# Patient Record
Sex: MALE | Race: WHITE | Employment: FULL TIME | URBAN - METROPOLITAN AREA
[De-identification: names, ages, dates, MRNs, and addresses within clinical notes are randomized per-mention and may not be internally consistent; named-entity substitution may affect disease eponyms.]

---

## 2023-01-18 ENCOUNTER — HOSPITAL ENCOUNTER (EMERGENCY)
Age: 58
Discharge: HOME | End: 2023-01-18
Attending: EMERGENCY MEDICINE
Payer: COMMERCIAL

## 2023-01-18 VITALS
HEART RATE: 63 BPM | DIASTOLIC BLOOD PRESSURE: 83 MMHG | RESPIRATION RATE: 20 BRPM | SYSTOLIC BLOOD PRESSURE: 180 MMHG | TEMPERATURE: 97.1 F | OXYGEN SATURATION: 99 %

## 2023-01-18 DIAGNOSIS — M54.32 SCIATICA OF LEFT SIDE: Primary | ICD-10-CM

## 2023-01-18 PROCEDURE — 74011636637 HC RX REV CODE- 636/637: Performed by: EMERGENCY MEDICINE

## 2023-01-18 PROCEDURE — 96372 THER/PROPH/DIAG INJ SC/IM: CPT

## 2023-01-18 PROCEDURE — 99284 EMERGENCY DEPT VISIT MOD MDM: CPT

## 2023-01-18 PROCEDURE — 74011250636 HC RX REV CODE- 250/636: Performed by: EMERGENCY MEDICINE

## 2023-01-18 PROCEDURE — 74011250637 HC RX REV CODE- 250/637: Performed by: EMERGENCY MEDICINE

## 2023-01-18 RX ORDER — PREDNISONE 20 MG/1
60 TABLET ORAL
Status: COMPLETED | OUTPATIENT
Start: 2023-01-18 | End: 2023-01-18

## 2023-01-18 RX ORDER — PREDNISONE 5 MG/1
TABLET ORAL
Qty: 21 TABLET | Refills: 0 | Status: SHIPPED | OUTPATIENT
Start: 2023-01-18

## 2023-01-18 RX ORDER — KETOROLAC TROMETHAMINE 30 MG/ML
30 INJECTION, SOLUTION INTRAMUSCULAR; INTRAVENOUS ONCE
Status: COMPLETED | OUTPATIENT
Start: 2023-01-18 | End: 2023-01-18

## 2023-01-18 RX ORDER — KETOROLAC TROMETHAMINE 10 MG/1
10 TABLET, FILM COATED ORAL
Qty: 10 TABLET | Refills: 0 | Status: SHIPPED | OUTPATIENT
Start: 2023-01-18

## 2023-01-18 RX ORDER — DIAZEPAM 5 MG/1
2.5 TABLET ORAL ONCE
Status: COMPLETED | OUTPATIENT
Start: 2023-01-18 | End: 2023-01-18

## 2023-01-18 RX ADMIN — KETOROLAC TROMETHAMINE 30 MG: 30 INJECTION, SOLUTION INTRAMUSCULAR; INTRAVENOUS at 05:45

## 2023-01-18 RX ADMIN — DIAZEPAM 2.5 MG: 5 TABLET ORAL at 05:45

## 2023-01-18 RX ADMIN — PREDNISONE 60 MG: 20 TABLET ORAL at 05:45

## 2023-01-18 NOTE — ED TRIAGE NOTES
Pt arrives to triage w/ complaint of lower back pain. Pt sees dr. Wing Kinsey w/ [facility] and has an appointment scheduled today but the pain as too bad to wait.

## 2023-01-18 NOTE — ED PROVIDER NOTES
55-year-old male with past medical history significant for lumbar spine degenerative disc disease presents with complaints of low back pain exacerbated over the past 2 days and radiating into left lower extremity causing numbness and tingling. Denies lower extremity. Denies saddle paresthesia. Denies changes in bowel or bladder habits. Patient reports pain was severe when weightbearing. He could not tolerate. Patient has an appointment scheduled for this afternoon with his orthopedist, Dr. Cristian Barillas, but pain was too severe. Denies any recent trauma. Denies fever, chills, nausea, vomiting, diarrhea, constipation, chest pain, shortness of breath. Similar pain in past relieved with corticosteroid injections by orthopedist.    Former smoker, occasional alcohol use, denies drug use  Ortho-gotke         Past Medical History:   Diagnosis Date    Bilateral carotid artery disease ([facility] [address]     [facility] panelApril 2014    LBBB (left bundle branch block)        Past Surgical History:   Procedure Laterality Date    HX ORTHOPAEDIC      right knee surgery         No family history on file.     Social History     Socioeconomic History    Marital status:      Spouse name: Not on file    Number of children: Not on file    Years of education: Not on file    Highest education level: Not on file   Occupational History    Not on file   Tobacco Use    Smoking status: Former    Smokeless tobacco: Not on file   Substance and Sexual Activity    Alcohol use: Yes     Comment: glass of wine    Drug use: No    Sexual activity: Not on file   Other Topics Concern    Not on file   Social History Narrative    Not on file     Social Determinants of Health     Financial Resource Strain: Not on file   Food Insecurity: Not on file   Transportation Needs: Not on file   Physical Activity: Not on file   Stress: Not on file   Social Connections: Not on file   Intimate Partner Violence: Not on file   Housing Stability: Not on file ALLERGIES: Patient has no known allergies. Review of Systems   Constitutional:  Negative for chills and fever. Respiratory:  Negative for cough and shortness of breath. Cardiovascular:  Negative for chest pain. Gastrointestinal:  Negative for abdominal pain, nausea and vomiting. Genitourinary:  Negative for dysuria and urgency. Musculoskeletal:  Positive for back pain. Skin:  Negative for wound. Neurological:  Negative for seizures and headaches. Vitals:    01/18/23 0448   BP: (!) 180/83   Pulse: 63   Resp: 20   Temp: 97.1 °F (36.2 °C)   SpO2: 99%            Physical Exam  Vitals and nursing note reviewed. Constitutional:       Appearance: He is well-developed. HENT:      Head: Normocephalic and atraumatic. Eyes:      Pupils: Pupils are equal, round, and reactive to light. Cardiovascular:      Rate and Rhythm: Normal rate and regular rhythm. Heart sounds: Normal heart sounds. Pulmonary:      Effort: Pulmonary effort is normal. No respiratory distress. Breath sounds: Normal breath sounds. No wheezing. Musculoskeletal:         General: Normal range of motion. Cervical back: Normal range of motion and neck supple. Lumbar back: No swelling, edema, deformity, signs of trauma or bony tenderness. Skin:     General: Skin is warm and dry. Neurological:      Mental Status: He is alert and oriented to person, place, and time. Psychiatric:         Behavior: Behavior normal.        Medical Decision Making  59-year-old male with degenerative disc disease presents with sciatica pain. Patient is well-appearing, no acute distress, hemodynamically stable, afebrile, nontoxic, no evidence of cauda equina. Plan-pain control. Amount and/or Complexity of Data Reviewed  Independent Historian: spouse    Risk  Prescription drug management. Procedures      5:50 AM  Patient's results have been reviewed with them.   Patient and/or family have verbally conveyed their understanding and agreement of the patient's signs, symptoms, diagnosis, treatment and prognosis and additionally agree to follow up as recommended or return to the Emergency Room should their condition change prior to follow-up. Discharge instructions have also been provided to the patient with some educational information regarding their diagnosis as well a list of reasons why they would want to return to the ER prior to their follow-up appointment should their condition change.

## 2023-03-10 ENCOUNTER — TELEPHONE (OUTPATIENT)
Dept: CARDIOLOGY CLINIC | Age: 58
End: 2023-03-10

## 2023-03-10 NOTE — TELEPHONE ENCOUNTER
Pt is a New Patient and trying to get cardiac clearance for Back surgery on 3/21/23  [address] [phone]. Patient has seen Tanner Donaldson in the past and is trying to established with him.        [phone]

## 2023-03-14 NOTE — TELEPHONE ENCOUNTER
Attempted to reach patient by telephone. A message was left for return call.      Future Appointments   Date Time Provider Annabella Haines   3/16/2023  4:00 PM MD ANDREAS Sousa AMB

## 2023-03-16 ENCOUNTER — OFFICE VISIT (OUTPATIENT)
Dept: CARDIOLOGY CLINIC | Age: 58
End: 2023-03-16
Payer: COMMERCIAL

## 2023-03-16 VITALS
RESPIRATION RATE: 18 BRPM | HEIGHT: 75 IN | DIASTOLIC BLOOD PRESSURE: 88 MMHG | HEART RATE: 70 BPM | WEIGHT: 195 LBS | SYSTOLIC BLOOD PRESSURE: 136 MMHG | BODY MASS INDEX: 24.25 KG/M2 | OXYGEN SATURATION: 100 %

## 2023-03-16 DIAGNOSIS — R07.9 CHEST PAIN, UNSPECIFIED TYPE: ICD-10-CM

## 2023-03-16 DIAGNOSIS — Z71.89 CARDIAC RISK COUNSELING: Primary | ICD-10-CM

## 2023-03-16 DIAGNOSIS — Z01.810 PRE-OPERATIVE CARDIOVASCULAR EXAMINATION: ICD-10-CM

## 2023-03-16 DIAGNOSIS — I65.23 BILATERAL CAROTID ARTERY OCCLUSION: ICD-10-CM

## 2023-03-16 DIAGNOSIS — I44.7 LBBB (LEFT BUNDLE BRANCH BLOCK): ICD-10-CM

## 2023-03-16 PROCEDURE — 99204 OFFICE O/P NEW MOD 45 MIN: CPT | Performed by: SPECIALIST

## 2023-03-16 PROCEDURE — 93000 ELECTROCARDIOGRAM COMPLETE: CPT | Performed by: SPECIALIST

## 2023-03-16 RX ORDER — LOSARTAN POTASSIUM 100 MG/1
100 TABLET ORAL DAILY
COMMUNITY
Start: 2023-03-07

## 2023-03-16 NOTE — PROGRESS NOTES
Danielle Bobo     1965       Kiana Mejia MD, [facility] [place]  Date of Visit-2023   PCP is Motley, Anell Najjar, MD   [facility] [place]  [facility]  HPI:  Danielle Bobo is a 62 y.o. male   Patient being seen for cardiac evaluation prior to a back surgery on 3/21/2023 saw Dr. Azar Bolton in [facility]. Patient was previously seen in 2016 records show office visit for chest pain and left bundle, Dr. Madison Zavala in 2016 referred for stress nuclear which showed inferior wall ischemia infarction is a possibility. We saw the patient to this. Recommended him to proceed to heart catheterization. Patient had heart cath on 2016 that showed normal coronaries normal ejection fraction. Patient had an echocardiogram 2016 that showed normal EF mild TR. Patient went to the emergency room on 2023 for sciatic on the left side. EKG today shows actually an EKG done at preadmission testing and shows sinus rhythm with a left bundle branch block. He is doing well and his back does not hurt he plays tennis rides bike physically active doing everything he would like to do he is an . He has no chest pain shortness of breath PND orthopnea. Left bundle branch is unchanged block is unchanged from the finding before. He had a cath that was normal he had a normal echo there is any suggest by physical exam or symptoms of things change. He is low axilla risk to proceed with back surgery. Please forward to preadmission testing at [facility] and Dr. Madison Zavala his primary care physician who he follows regular with he has hypertension he is on some losartan.     No quit smoking  smoked for 25 years drink 2 drinks of alcohol a week 3 cups of coffee   lives with son has 2 children likes tennis and biking family history mother passed of alcoholism at 67 father  unknown cause 80 review of systems positive for very rare palpitations that have not occurred recently and of the back pain which is main reason he is going the rest of 12 system review is negative. Today. .    EKG:   Key CAD CHF Meds               losartan (COZAAR) 100 mg tablet (Taking) Take 100 mg by mouth daily. Assessment/Plan:     There are no Patient Instructions on file for this visit. 1. Bilateral carotid artery occlusion  ***    2. LBBB (left bundle branch block)  ***  - AMB POC EKG ROUTINE W/ 12 LEADS, INTER & REP          Impression:   1. Bilateral carotid artery occlusion    2. LBBB (left bundle branch block)       Cardiac History:   No specialty comments available. No future appointments. Patient Care Team:  Lori Aguilar MD as PCP - General (Internal Medicine Physician)  Walter Conroy MD (Cardiovascular Disease Physician)   ROS-except as noted above. . A complete cardiac and respiratory are reviewed and negative except as above ; Resp-denies wheezing  or productive cough,. Const- No unusual weight loss or fever; Neuro-no recent seizure or CVA ; GI- No BRBPR, abdom pain, bloating ; - no  hematuria   see supplement sheet, initialed and to be scanned by staff    Past Medical History:   Diagnosis Date    Bilateral carotid artery disease ([facility] [address]     on [facility] panelApril 2014    LBBB (left bundle branch block)       Social Hx= reports that he has quit smoking. He has never been exposed to tobacco smoke. He does not have any smokeless tobacco history on file. He reports that he does not currently use alcohol after a past usage of about 2.0 standard drinks per week. He reports that he does not use drugs. Family Hx- family history is not on file. No Known Allergies     Exam and Labs:  Visit Vitals  /88 (BP 1 Location: Left upper arm, BP Patient Position: Sitting, BP Cuff Size: Adult)   Pulse 70   Resp 18   Ht 6' 3\" (1.905 m)   Wt 195 lb (88.5 kg)   SpO2 100%   BMI 24.37 kg/m²    @Constitutional:  NAD, comfortable  Head: NC,AT. Eyes: No scleral icterus.  Neck: Neck supple. No JVD present. Throat: moist mucous membranes. Chest: Effort normal & normal respiratory excursion . Neurological: alert, conversant and oriented . Skin: Skin is not cold. No obvious systemic rash noted. Not diaphoretic. No erythema. Psychiatric:  Grossly normal mood and affect. Behavior appears normal. Extremities:  no clubbing or cyanosis. Abdomen: non distended    Lungs:breath sounds normal. No stridor. distress, wheezes or  Rales. Heart:    {findings; exam heart:[id number]::\"normal rate, regular rhythm, normal S1, S2, no murmurs,   rubs, clicks or gallops\"} , PMI non displaced. Edema: Edema is none. No results found for: CHOL, CHOLX, CHLST, CHOLV, HDL, HDLP, LDL, LDLC, DLDLP, TGLX, TRIGL, TRIGP, CHHD, CHHDX  Lab Results   Component Value Date/Time    Sodium 141 08/08/2016 01:52 AM    Potassium 3.5 08/08/2016 01:52 AM    Chloride 103 08/08/2016 01:52 AM    CO2 28 08/08/2016 01:52 AM    Anion gap 10 08/08/2016 01:52 AM    Glucose 96 08/08/2016 01:52 AM    BUN 12 08/08/2016 01:52 AM    Creatinine 0.95 08/08/2016 01:52 AM    BUN/Creatinine ratio 13 08/08/2016 01:52 AM    GFR est AA >60 08/08/2016 01:52 AM    GFR est non-AA >60 08/08/2016 01:52 AM    Calcium 9.4 08/08/2016 01:52 AM      Wt Readings from Last 3 Encounters:   03/16/23 195 lb (88.5 kg)   08/26/16 195 lb (88.5 kg)   08/18/16 197 lb 9.6 oz (89.6 kg)      BP Readings from Last 3 Encounters:   03/16/23 136/88   01/18/23 (!) 180/83   08/26/16 134/84      Current Outpatient Medications   Medication Sig    losartan (COZAAR) 100 mg tablet Take 100 mg by mouth daily. predniSONE (STERAPRED) 5 mg dose pack See administration instruction per 5mg dose pack (Patient not taking: Reported on 3/16/2023)    ketorolac (TORADOL) 10 mg tablet Take 1 Tablet by mouth every six (6) hours as needed for Pain. (Patient not taking: Reported on 3/16/2023)    meclizine (ANTIVERT) 25 mg tablet Take 1 Tab by mouth three (3) times daily as needed for Dizziness. (Patient not taking: Reported on 3/16/2023)     No current facility-administered medications for this visit. Impression see above.

## 2023-04-18 ENCOUNTER — TELEPHONE (OUTPATIENT)
Dept: CARDIOLOGY CLINIC | Age: 58
End: 2023-04-18

## 2023-04-18 NOTE — TELEPHONE ENCOUNTER
MD Elizabeth Locke, RN; Jake Rainey, RN  See if [facility] PAT has him on for any surgery and if so send this note    Called and spoke with Maurice Corral from [facility] at [address] [place] [address]. States patient surgery was cancelled at this time. Advised that she had received note from 3/16/2023 and saved it in the event patient reschedules he surgery for a later date.

## 2024-06-29 ENCOUNTER — HOSPITAL ENCOUNTER (EMERGENCY)
Facility: HOSPITAL | Age: 59
Discharge: HOME | End: 2024-06-29
Attending: STUDENT IN AN ORGANIZED HEALTH CARE EDUCATION/TRAINING PROGRAM
Payer: COMMERCIAL

## 2024-06-29 VITALS
DIASTOLIC BLOOD PRESSURE: 92 MMHG | OXYGEN SATURATION: 98 % | HEART RATE: 56 BPM | RESPIRATION RATE: 16 BRPM | TEMPERATURE: 97.6 F | HEIGHT: 74 IN | BODY MASS INDEX: 25.66 KG/M2 | WEIGHT: 199.96 LBS | SYSTOLIC BLOOD PRESSURE: 147 MMHG

## 2024-06-29 DIAGNOSIS — S05.01XA ABRASION OF RIGHT CORNEA, INITIAL ENCOUNTER: ICD-10-CM

## 2024-06-29 DIAGNOSIS — Z77.098 CHEMICAL EXPOSURE OF EYE: Primary | ICD-10-CM

## 2024-06-29 PROCEDURE — 6370000000 HC RX 637 (ALT 250 FOR IP): Performed by: PHYSICIAN ASSISTANT

## 2024-06-29 PROCEDURE — 90714 TD VACC NO PRESV 7 YRS+ IM: CPT | Performed by: PHYSICIAN ASSISTANT

## 2024-06-29 PROCEDURE — 90471 IMMUNIZATION ADMIN: CPT | Performed by: PHYSICIAN ASSISTANT

## 2024-06-29 PROCEDURE — 99284 EMERGENCY DEPT VISIT MOD MDM: CPT

## 2024-06-29 PROCEDURE — 6360000002 HC RX W HCPCS: Performed by: PHYSICIAN ASSISTANT

## 2024-06-29 RX ORDER — ERYTHROMYCIN 5 MG/G
OINTMENT OPHTHALMIC ONCE
Status: COMPLETED | OUTPATIENT
Start: 2024-06-29 | End: 2024-06-29

## 2024-06-29 RX ORDER — ERYTHROMYCIN 5 MG/G
OINTMENT OPHTHALMIC
Qty: 3.5 G | Refills: 0 | Status: SHIPPED | OUTPATIENT
Start: 2024-06-29 | End: 2024-07-09

## 2024-06-29 RX ORDER — IBUPROFEN 400 MG/1
800 TABLET ORAL
Status: COMPLETED | OUTPATIENT
Start: 2024-06-29 | End: 2024-06-29

## 2024-06-29 RX ORDER — TETRACAINE HYDROCHLORIDE 5 MG/ML
2 SOLUTION OPHTHALMIC
Status: COMPLETED | OUTPATIENT
Start: 2024-06-29 | End: 2024-06-29

## 2024-06-29 RX ADMIN — TETRACAINE HYDROCHLORIDE 2 DROP: 5 SOLUTION OPHTHALMIC at 20:45

## 2024-06-29 RX ADMIN — FLUORESCEIN SODIUM 1 MG: 1 STRIP OPHTHALMIC at 20:45

## 2024-06-29 RX ADMIN — ERYTHROMYCIN 1 CM: 5 OINTMENT OPHTHALMIC at 21:44

## 2024-06-29 RX ADMIN — IBUPROFEN 800 MG: 400 TABLET, FILM COATED ORAL at 20:45

## 2024-06-29 RX ADMIN — CLOSTRIDIUM TETANI TOXOID ANTIGEN (FORMALDEHYDE INACTIVATED) AND CORYNEBACTERIUM DIPHTHERIAE TOXOID ANTIGEN (FORMALDEHYDE INACTIVATED) 0.5 ML: 5; 2 INJECTION, SUSPENSION INTRAMUSCULAR at 20:44

## 2024-06-29 ASSESSMENT — PAIN DESCRIPTION - DESCRIPTORS
DESCRIPTORS: BURNING
DESCRIPTORS: BURNING

## 2024-06-29 ASSESSMENT — PAIN - FUNCTIONAL ASSESSMENT
PAIN_FUNCTIONAL_ASSESSMENT: PREVENTS OR INTERFERES SOME ACTIVE ACTIVITIES AND ADLS
PAIN_FUNCTIONAL_ASSESSMENT: 0-10
PAIN_FUNCTIONAL_ASSESSMENT: PREVENTS OR INTERFERES SOME ACTIVE ACTIVITIES AND ADLS

## 2024-06-29 ASSESSMENT — PAIN DESCRIPTION - PAIN TYPE
TYPE: ACUTE PAIN
TYPE: ACUTE PAIN

## 2024-06-29 ASSESSMENT — PAIN DESCRIPTION - ONSET
ONSET: GRADUAL
ONSET: GRADUAL

## 2024-06-29 ASSESSMENT — PAIN DESCRIPTION - FREQUENCY
FREQUENCY: CONTINUOUS
FREQUENCY: CONTINUOUS

## 2024-06-29 ASSESSMENT — PAIN DESCRIPTION - ORIENTATION
ORIENTATION: RIGHT
ORIENTATION: RIGHT

## 2024-06-29 ASSESSMENT — PAIN DESCRIPTION - LOCATION
LOCATION: EYE
LOCATION: EYE

## 2024-06-29 ASSESSMENT — PAIN SCALES - GENERAL
PAINLEVEL_OUTOF10: 7
PAINLEVEL_OUTOF10: 5

## 2024-06-30 NOTE — ED TRIAGE NOTES
Patient arrives ambulatory from home w CC chemical burn in his right eye. Says he got cleaning solution from the gym in his eye earlier tonight. Does not know what the cleaning solution was. Pt complaining of pain and blurred vision.

## 2024-06-30 NOTE — ED PROVIDER NOTES
[facility] EMERGENCY DEP  EMERGENCY DEPARTMENT ENCOUNTER      Pt Name: Nghia Sharif  MRN: [id number]  Birthdate 1965  Date of evaluation: 6/29/2024  Provider: Trever Fabian PA-C    CHIEF COMPLAINT       Chief Complaint   Patient presents with    Eye Problem         HISTORY OF PRESENT ILLNESS   (Location/Symptom, Timing/Onset, Context/Setting, Quality, Duration, Modifying Factors, Severity)  Note limiting factors.   58 yo M presenting for right eye pain w/ blurred vision that began after he cleaned exercise equipment with cleaning solution and then rubbed his eye.  Does not believe he scratched his eye.  Does not where contacts.  Admits to blurred vision.  Pt went to urgent care prior to coming here where they flushed his eye with 500 cc normal saline and tested visual acuity 20/30 right eye and 20/20 left eye at urgent care.  Patient reports improvement of pain and blurred vision with flushing.              Review of External Medical Records:     Nursing Notes were reviewed.    REVIEW OF SYSTEMS    (2-9 systems for level 4, 10 or more for level 5)     Review of Systems   Eyes:  Positive for pain and visual disturbance.   All other systems reviewed and are negative.      Except as noted above the remainder of the review of systems was reviewed and negative.       PAST MEDICAL HISTORY     Past Medical History:   Diagnosis Date    Bilateral carotid artery disease (HCC)     [facility] panelApril 2014    LBBB (left bundle branch block)          SURGICAL HISTORY       Past Surgical History:   Procedure Laterality Date    ORTHOPEDIC SURGERY      right knee surgery         CURRENT MEDICATIONS       Discharge Medication List as of 6/29/2024  9:43 PM        CONTINUE these medications which have NOT CHANGED    Details   ketorolac (TORADOL) 10 MG tablet Take 10 mg by mouth every 6 hours as neededHistorical Med      losartan (COZAAR) 100 MG tablet Take 100 mg by mouth dailyHistorical Med      meclizine (ANTIVERT) 25 MG

## 2024-06-30 NOTE — DISCHARGE INSTRUCTIONS
Call your ophthalmologist within 48 hours for close and office follow-up.  Use medication as prescribed.  Return immediately if any new or worsening symptoms.  Thank you for allowing us to be a part of your care

## 2025-04-02 NOTE — PROGRESS NOTES
Patient: Nghia Sharif  : 1965    Primary Cardiologist:Dr. CLARE Aguiar  EP Cardiologist:NONE   PCP: Alex Ibrahim MD    Today's Date: 4/3/2025      ASSESSMENT AND PLAN:     Assessment and Plan:  Assessment & Plan  SVT (supraventricular tachycardia)  -had palpitations in the past that resolved but over the last few weeks he was having frequent episodes and PCP placed holter monitor and he was told he was having runs of SVT-I do not have this data- will request  He reports that he is having a lot of stress at work and they were mostly occurring in car on the way to work  They have since improved   EKG today with SB 1st degree AVB, LBBB-known   Normal echo 2016  Repeat echo  Refer to EP    Orders:    ECHO COMPLETE ADULT (TTE) W OR WO CONTR- Clinic Performed; Future    LBBB (left bundle branch block)  EKG today with SB 1st degree AVB, LBBB- known  Had abnormal stress test in 2016 per Dr. Aguiar noted ans then LHC 2016 with normal coronaries and ejection fraction. Echo 2016 with normal EF  Repeat echo    Orders:    EKG 12 Lead    ECHO COMPLETE ADULT (TTE) W OR WO CONTR- Clinic Performed; Future    Primary hypertension  BP well controlled  Losartan 100mg      Follow up with Dr. CLARE Aguiar in 3 months, EP       HISTORY OF PRESENT ILLNESS:     History of Present Illness:  Nghia Sharif is a 60 y.o. male     He last saw Dr. Aguiar in 2023. Per his note: Nghia Sharif is a 57 y.o. male   Consult preop  Patient being seen for cardiac evaluation prior to a back surgery on 3/21/2023 saw Dr. Pollack in [facility].  Patient was previously seen in 2016 records show office visit for chest pain and left bundle, Dr. Ibrahim in 2016 referred for stress nuclear which showed inferior wall ischemia infarction is a possibility.  We saw the patient to this.   Recommended him to proceed to heart catheterization.    Patient had heart cath on 2016 that showed normal coronaries normal ejection

## 2025-04-03 ENCOUNTER — OFFICE VISIT (OUTPATIENT)
Age: 60
End: 2025-04-03
Payer: COMMERCIAL

## 2025-04-03 ENCOUNTER — TELEPHONE (OUTPATIENT)
Age: 60
End: 2025-04-03

## 2025-04-03 VITALS
HEART RATE: 61 BPM | BODY MASS INDEX: 25.62 KG/M2 | OXYGEN SATURATION: 100 % | SYSTOLIC BLOOD PRESSURE: 118 MMHG | DIASTOLIC BLOOD PRESSURE: 82 MMHG | HEIGHT: 74 IN | WEIGHT: 199.6 LBS

## 2025-04-03 DIAGNOSIS — I47.10 SVT (SUPRAVENTRICULAR TACHYCARDIA): Primary | ICD-10-CM

## 2025-04-03 DIAGNOSIS — I44.7 LBBB (LEFT BUNDLE BRANCH BLOCK): ICD-10-CM

## 2025-04-03 DIAGNOSIS — I10 PRIMARY HYPERTENSION: ICD-10-CM

## 2025-04-03 PROCEDURE — 93000 ELECTROCARDIOGRAM COMPLETE: CPT

## 2025-04-03 PROCEDURE — 3079F DIAST BP 80-89 MM HG: CPT

## 2025-04-03 PROCEDURE — 99214 OFFICE O/P EST MOD 30 MIN: CPT

## 2025-04-03 PROCEDURE — 3074F SYST BP LT 130 MM HG: CPT

## 2025-04-03 ASSESSMENT — PATIENT HEALTH QUESTIONNAIRE - PHQ9
SUM OF ALL RESPONSES TO PHQ QUESTIONS 1-9: 0
1. LITTLE INTEREST OR PLEASURE IN DOING THINGS: NOT AT ALL
SUM OF ALL RESPONSES TO PHQ QUESTIONS 1-9: 0
2. FEELING DOWN, DEPRESSED OR HOPELESS: NOT AT ALL

## 2025-04-03 NOTE — PROGRESS NOTES
1. Have you been to the ER, urgent care clinic since your last visit?  Hospitalized since your last visit?  1/24, back injury    2. Have you seen or consulted any other health care providers outside of the [facility] since your last visit?  Include any pap smears or colon screening.   [facility], PCP

## 2025-04-03 NOTE — ASSESSMENT & PLAN NOTE
EKG today with SB 1st degree AVB, LBBB- known  Had abnormal stress test in 2016 per Dr. Aguiar noted ans then C August 2016 with normal coronaries and ejection fraction. Echo 2016 with normal EF  Repeat echo    Orders:    EKG 12 Lead    ECHO COMPLETE ADULT (TTE) W OR WO CONTR- Clinic Performed; Future

## 2025-04-10 ENCOUNTER — TELEPHONE (OUTPATIENT)
Age: 60
End: 2025-04-10

## 2025-04-11 NOTE — TELEPHONE ENCOUNTER
Left HIPAA compliant  to check mychart or return call  Future Appointments   Date Time Provider Department Center   5/12/2025  8:00 AM [facility] ECHO 1 [facility] AMB   5/12/2025  8:40 AM Thierry Cuellar MD [facility] AMB   7/9/2025  9:20 AM Randell Aguiar MD [facility] AMB

## 2025-05-12 ENCOUNTER — OFFICE VISIT (OUTPATIENT)
Age: 60
End: 2025-05-12
Payer: COMMERCIAL

## 2025-05-12 VITALS
SYSTOLIC BLOOD PRESSURE: 134 MMHG | OXYGEN SATURATION: 99 % | DIASTOLIC BLOOD PRESSURE: 86 MMHG | HEIGHT: 74 IN | BODY MASS INDEX: 25.8 KG/M2 | HEART RATE: 58 BPM | WEIGHT: 201 LBS

## 2025-05-12 DIAGNOSIS — I44.7 LBBB (LEFT BUNDLE BRANCH BLOCK): ICD-10-CM

## 2025-05-12 DIAGNOSIS — I49.1 PAC (PREMATURE ATRIAL CONTRACTION): ICD-10-CM

## 2025-05-12 DIAGNOSIS — I47.10 SVT (SUPRAVENTRICULAR TACHYCARDIA): Primary | ICD-10-CM

## 2025-05-12 DIAGNOSIS — I10 PRIMARY HYPERTENSION: ICD-10-CM

## 2025-05-12 PROCEDURE — 93000 ELECTROCARDIOGRAM COMPLETE: CPT | Performed by: HOSPITALIST

## 2025-05-12 PROCEDURE — 3075F SYST BP GE 130 - 139MM HG: CPT | Performed by: HOSPITALIST

## 2025-05-12 PROCEDURE — 3079F DIAST BP 80-89 MM HG: CPT | Performed by: HOSPITALIST

## 2025-05-12 PROCEDURE — 99204 OFFICE O/P NEW MOD 45 MIN: CPT | Performed by: HOSPITALIST

## 2025-05-12 RX ORDER — METOPROLOL SUCCINATE 25 MG/1
12.5 TABLET, EXTENDED RELEASE ORAL DAILY
Qty: 45 TABLET | Refills: 1 | Status: SHIPPED | OUTPATIENT
Start: 2025-05-12

## 2025-05-12 NOTE — PROGRESS NOTES
Cardiac Electrophysiology OFFICE Consultation Note     Subjective:      Nghia Sharif is a pleasant 60 y.o. male.  He is a resident of [facility] [address].    Primary Cardiologist: Randell Aguiar MD    He works as an  .    Nghia Sharif presents to electrophysiology clinic for management of Atrial Arrythmias.    His current medical conditions and PMH are detailed below.    Today's visit:  Date: 5/12/2025  Palpitations - has had brief palpitations less than 10 seconds for a long time. Recently earlier this year, has had longer palpitations. Some episodes last for 15 minutes. Feels pounding and irregular beats sensation.  Denies chest pain or dizziness.    Cardiac Rhythm Testing     All EKGs were personally interpreted by me as below    EKG from 5/12/2025 shows LBBB,  ms, sinus rosa maria 53 bpm    Holter monitor February-March 2025:  I personally reviewed and interpreted 2 week SysClass monitor.  Sinus rhythm with rates varying from  bpm.  Average 68 bpm  PAC burden 5%  620 episodes of SVT with fasteset and  longest episode lasting 7 beats at 143 bpm.  11 patient triggered events (symptoms not recorded).  These are associated with SVT and PACs.        Assessment:       ICD-10-CM    1. SVT (supraventricular tachycardia)  I47.10 EKG 12 Lead      2. LBBB (left bundle branch block)  I44.7 EKG 12 Lead      3. Primary hypertension  I10 EKG 12 Lead      4. PAC (premature atrial contraction)  I49.1                 # SVT/Atrial Tachycardia  # Frequent PACs  With short episodes of SVT.  They are most consistent with atrial tachycardia.  Frequent PVCs with burden of 5%.  Symptoms seem to correlate with the short SVT episodes.  -- I discussed with patient that we can try a beta-blocker to see if it improves his symptoms.  He does have sinus bradycardia so it is possible that he has fatigue issues.  Will only try low-dose beta-blocker.  Given LBBB and associated risk of cardiomyopathy, I think

## 2025-05-12 NOTE — PROGRESS NOTES
1. Have you been to the ER, urgent care clinic since your last visit?  Hospitalized since your last visit?  No    2. Have you seen or consulted any other health care providers outside of the [facility] since your last visit?  Include any pap smears or colon screening.

## 2025-06-02 ENCOUNTER — TELEPHONE (OUTPATIENT)
Age: 60
End: 2025-06-02

## 2025-06-02 NOTE — TELEPHONE ENCOUNTER
Attempted to contact patient to discuss echo results and recommendations. Left patient VM to return call.

## 2025-06-04 ENCOUNTER — CLINICAL DOCUMENTATION (OUTPATIENT)
Age: 60
End: 2025-06-04

## 2025-06-04 DIAGNOSIS — Z76.89 SLEEP CONCERN: Primary | ICD-10-CM

## 2025-06-04 DIAGNOSIS — R53.83 FATIGUE: ICD-10-CM

## 2025-06-04 NOTE — PROGRESS NOTES
WatchPat home sleep study was denied by Insurance due to lack of medical necessity.    I will go a ahead and send referral to Sleep Medicine.

## 2025-06-20 ENCOUNTER — TELEPHONE (OUTPATIENT)
Age: 60
End: 2025-06-20

## 2025-06-20 NOTE — TELEPHONE ENCOUNTER
Telephone call made to patient. Two Identifiers used.  Explained to patient that Np Eber wanted him to get a nuclear exercise stress test. Scheduled testing. Explained to patient his instructions.  Patient verbalized understanding and has no further questions.      You will be scheduled for a Nuclear Stress Test after your appointment today.    Nuclear stress testing evaluates blood flow to your heart muscle and assesses cardiac function. There are 2 parts (Rest/Stress) to this procedure and will include either an exercise on a treadmill or an IV administration of a stressing medication called Lexiscan. Your cardiologist will determine which type of testing is best for you. This test can be performed in one day unless it is determined that better quality images will be obtained by performing the test over two days.     *Please arrive 15 minutes prior to your appointment time    Test Duration:    -One day testing will take 4 hours     Day of testing instructions:    NO CAFFEINE (not even decaffeinated products) 24 HOURS PRIOR TO TESTING. This includes coffee, soda, tea, chocolate, multivitamins, and migraine medication, like Excedrin or Fioricet that contains caffeine.  Nothing to eat or drink 4 HOURS prior to testing  NO NICOTINE 12 hours prior to testing  Hold any medications requested by your cardiologist. Otherwise take medications as directed with a few sips of water. If you are unsure you may bring your medications with you to take after instructed by your stressing nurse. It is recommended you hold METOPROLOL 24 HOUR prior to your test.   Wear comfortable clothes and shoes (Shirts with no metal, shorts or pants, tennis shoes, no heels or flip flops)    IMPORTANT: This testing involves a cardiac tracer ordered specifically for you. If you are unable to make your appointment, please call to cancel/reschedule AT LEAST 24 hours prior to your appointment so your tracer can be cancelled. [phone].    Future

## 2025-06-20 NOTE — TELEPHONE ENCOUNTER
Spoke with patient identified X2. Went over echo results with reduced EF. Pt reports that he is having sometimes what he describes as \"tense\" like an anxiety feeling in chest. I have recommended that we move forward with stress testing and he is agreeable. I will have nurse reach out to schedule exercise nuclear stress test.

## 2025-06-20 NOTE — TELEPHONE ENCOUNTER
HI-    I spoke with him about getting exercise nuclear stress test and he is agreeable. Can you call him to get him scheduled for this for chest pain, reduced EF.     Thanks- Eber

## 2025-07-09 ENCOUNTER — OFFICE VISIT (OUTPATIENT)
Age: 60
End: 2025-07-09
Payer: COMMERCIAL

## 2025-07-09 VITALS
OXYGEN SATURATION: 98 % | SYSTOLIC BLOOD PRESSURE: 130 MMHG | HEART RATE: 72 BPM | HEIGHT: 74 IN | WEIGHT: 202 LBS | DIASTOLIC BLOOD PRESSURE: 60 MMHG | BODY MASS INDEX: 25.93 KG/M2 | RESPIRATION RATE: 16 BRPM

## 2025-07-09 DIAGNOSIS — I47.10 SVT (SUPRAVENTRICULAR TACHYCARDIA): Primary | ICD-10-CM

## 2025-07-09 DIAGNOSIS — R07.9 CHEST PAIN, UNSPECIFIED TYPE: ICD-10-CM

## 2025-07-09 DIAGNOSIS — I10 PRIMARY HYPERTENSION: ICD-10-CM

## 2025-07-09 DIAGNOSIS — I44.7 LEFT BUNDLE BRANCH BLOCK: ICD-10-CM

## 2025-07-09 PROCEDURE — 3078F DIAST BP <80 MM HG: CPT | Performed by: SPECIALIST

## 2025-07-09 PROCEDURE — 99214 OFFICE O/P EST MOD 30 MIN: CPT | Performed by: SPECIALIST

## 2025-07-09 PROCEDURE — 93000 ELECTROCARDIOGRAM COMPLETE: CPT | Performed by: SPECIALIST

## 2025-07-09 PROCEDURE — 3074F SYST BP LT 130 MM HG: CPT | Performed by: SPECIALIST

## 2025-07-09 ASSESSMENT — PATIENT HEALTH QUESTIONNAIRE - PHQ9
SUM OF ALL RESPONSES TO PHQ QUESTIONS 1-9: 0
SUM OF ALL RESPONSES TO PHQ QUESTIONS 1-9: 0
2. FEELING DOWN, DEPRESSED OR HOPELESS: NOT AT ALL
SUM OF ALL RESPONSES TO PHQ QUESTIONS 1-9: 0
SUM OF ALL RESPONSES TO PHQ QUESTIONS 1-9: 0
1. LITTLE INTEREST OR PLEASURE IN DOING THINGS: NOT AT ALL

## 2025-07-09 NOTE — PATIENT INSTRUCTIONS
Stop taking your losartan.  Check your blood pressure at different times once or twice a day in the manner we teach you today.  Record 10-20 numbers and send those to me by [employer].  If your blood pressure stays on average above 135 restart the losartan at 25 mg at night.    Once we assess your blood pressure medicine needs we will then see if your tachycardia persists.  What you have had so far is fairly benign SVT.  A medicine called flecainide could be an option or we could use a beta-blocker like metoprolol atenolol or Nebivolol.  We can also choose not to use a medicine if you are not being limited by the tachycardia.    In addition to stopping the losartan, the plan will be to get a repeat Holter monitor and a limited echo to look at the heart pumping unction in 3 months when you see me back.    Continue your exercise program which is done well for you and enjoy your trip to [facility].

## 2025-07-09 NOTE — PROGRESS NOTES
Nghia Sharif     1965       Randell Aguiar MD, [facility]  Date of Visit-7/9/2025   PCP is Alex Ibrahim MD   [facility] [place]  [facility]  HPI:  Nghia Sharif is a 60 y.o. male   3-month visit since last visit with NP 4/3/2025  SVT, LBBB, HTN    Today-patient seen for SVT hypertension.  He gets a washout feeling about an hour after taking his losartan.  His blood pressure was low and recently switched from 100 to 50 mg and still has that feeling.  He notes tachycardia it is pretty frequent but brief in duration.  It happens about 20 times let say in the last 2 weeks which would mean more than once a day.  He thinks it last 1 to 10 minutes where he feels washed out.  The tachycardia is probably shorter but still about a few minutes.  He has been going to the gym for the last 18 months regularly and has been gaining muscle weight.  He overall feels the best when he is at the gym.  He says when I am at the gym \"it is perfect\".  He has some mild back issues.  He is getting dizziness with the blood pressure medication.  Overall he started with this tachycardia and changes in blood pressure since about Sharyn.  Initially was palpitations.  His blood pressure was about 118.  Over the last 3 weeks prior he then developed more tachycardia feels like an anxiety in his chest or if it is dizziness is hard for him to say.  He never took the beta-blocker he did not feel comfortable with needing that.  I would agree.  Denies chest pain, edema, syncope or shortness of breath at rest     Currently on losartan 50 mg, patient is not taking metoprolol for palpitations saw Dr. Cuellar 5/12/2025 noting left bundle branch block Holter February 2025 showed sinus rhythm PAC burden of 5% he had 620 episodes of SVT longest at 7 beats and his triggered events appear to correlate to SVT.  Dr. Cuellar recommended considering beta-blocker low-dose metoprolol 12.5 daily  Had seen PCP

## 2025-09-03 ENCOUNTER — TELEPHONE (OUTPATIENT)
Age: 60
End: 2025-09-03